# Patient Record
Sex: MALE | Race: WHITE | NOT HISPANIC OR LATINO | Employment: OTHER | URBAN - METROPOLITAN AREA
[De-identification: names, ages, dates, MRNs, and addresses within clinical notes are randomized per-mention and may not be internally consistent; named-entity substitution may affect disease eponyms.]

---

## 2023-03-09 ENCOUNTER — OFFICE VISIT (OUTPATIENT)
Dept: ORTHOPEDICS | Facility: CLINIC | Age: 63
End: 2023-03-09
Payer: COMMERCIAL

## 2023-03-09 ENCOUNTER — ANCILLARY PROCEDURE (OUTPATIENT)
Dept: GENERAL RADIOLOGY | Facility: CLINIC | Age: 63
End: 2023-03-09
Attending: FAMILY MEDICINE
Payer: COMMERCIAL

## 2023-03-09 VITALS — SYSTOLIC BLOOD PRESSURE: 122 MMHG | DIASTOLIC BLOOD PRESSURE: 82 MMHG

## 2023-03-09 DIAGNOSIS — M25.511 RIGHT SHOULDER PAIN: ICD-10-CM

## 2023-03-09 DIAGNOSIS — M75.31 CALCIFIC TENDINITIS OF RIGHT SHOULDER: Primary | ICD-10-CM

## 2023-03-09 DIAGNOSIS — M25.511 ACUTE PAIN OF RIGHT SHOULDER: ICD-10-CM

## 2023-03-09 PROCEDURE — 20611 DRAIN/INJ JOINT/BURSA W/US: CPT | Mod: LT | Performed by: FAMILY MEDICINE

## 2023-03-09 PROCEDURE — 99203 OFFICE O/P NEW LOW 30 MIN: CPT | Mod: 25 | Performed by: FAMILY MEDICINE

## 2023-03-09 PROCEDURE — 73030 X-RAY EXAM OF SHOULDER: CPT | Mod: TC | Performed by: RADIOLOGY

## 2023-03-09 RX ADMIN — BETAMETHASONE SODIUM PHOSPHATE AND BETAMETHASONE ACETATE 6 MG: 3; 3 INJECTION, SUSPENSION INTRA-ARTICULAR; INTRALESIONAL; INTRAMUSCULAR; SOFT TISSUE at 17:05

## 2023-03-09 RX ADMIN — ROPIVACAINE HYDROCHLORIDE 4 ML: 5 INJECTION, SOLUTION EPIDURAL; INFILTRATION; PERINEURAL at 17:05

## 2023-03-09 ASSESSMENT — PAIN SCALES - GENERAL: PAINLEVEL: MODERATE PAIN (4)

## 2023-03-09 NOTE — PROGRESS NOTES
ASSESSMENT & PLAN    Dat was seen today for pain.    Diagnoses and all orders for this visit:    Calcific tendinitis of right shoulder  -     Large Joint Injection/Arthocentesis: L subacromial bursa    Acute pain of right shoulder  -     XR Shoulder Right G/E 3 Views; Future      This issue is acute and Worsening.    # Acute Right Shoulder Pain: Dat Arango  was seen today for acute worsening left shoulder pain. Symptoms had been going on for 2-3 weeks. On examination there are positive findings of tenderness to palpation over the right shoulder pain with range of motion and rotator cuff testing. Pain affecting sleep. Imaging findings showed right shoulder calcific tendinitis. Likely cause of patient's condition due to flare of calcific tendinitis. Counseled patient on nature of condition and treatment options.  Given this plan as below, follow-up 2 weeks     Image Findings: calcific tendinitis in right shoulder  Treatment: Activities as tolerated, home exercises given today  Job: As tolerated  Medications/Injections: Limited tylenol/ibuprofen for pain for 1-2 weeks, Topical Voltaren gel, right subacromial bursa steroid injection  Follow-up: In 2 weeks if symptoms do not improve, sooner if worsening  Can consider repeat evaluation       Khoa Guzman MD  [facility]    -----  Chief Complaint   Patient presents with     Left Shoulder - Pain       SUBJECTIVE  Dat Arango is a/an 62 year old male who is seen as a self referral, WALK IN for evaluation of left shoulder pain.     The patient is seen by themselves.  The patient is Left handed    Onset: 3/7/23, 2 day(s) ago. Patient describes injury as shoveling snow. Pain after raking roof.  Location of Pain: right lateral shoulder  Worsened by: shoulder abduction, overhead movements   Better with: Tylenol  Treatments tried: rest/activity avoidance and Tylenol  Associated symptoms: no distal numbness or tingling; denies  swelling or warmth    Orthopedic/Surgical history: YES - Similar pain about 2 weeks ago shoveling snow off roof   Social History/Occupation:      No family history pertinent to patient's problem today.      REVIEW OF SYSTEMS:  Review of Systems  Constitutional, HEENT, cardiovascular, pulmonary, gi and gu systems are negative, except as otherwise noted.    OBJECTIVE:  /82    General: healthy, alert and in no distress  HEENT: no scleral icterus or conjunctival erythema  Skin: no suspicious lesions or rash. No jaundice.  CV: distal perfusion intact    Resp: normal respiratory effort without conversational dyspnea   Psych: normal mood and affect  Gait: normal steady gait with appropriate coordination and balance   Neuro: Normal light sensory exam of right upper extremity      Ortho Exam   RIGHT SHOULDER  Inspection:    no swelling, bruising, discoloration, or obvious deformity or asymmetry  Palpation:  Significant tenderness to palpation over the supraspinatus    Active Range of Motion:   ROM limited 2/2 severe pain      Strength:  Strength testing limited 2/2 severe pain    CERVICAL SPINE  Inspection:    normal cervical lordosis present, rounded shoulders, forward head posture  Palpation:    Nontender.  Range of Motion:     Flexion full    Extension full    Right side bend full    Left side bend full    Right rotation full    Left rotation full  Strength:    Full strength throughout all neck muscles  Special Tests:    Positive: None    Negative: Spurling's (bilateral)        RADIOLOGY:  I independently  ordered, visualized and reviewed these images with the patient  Right shoulder calcific tendinitis      Review of external notes as documented elsewhere in note  Review of the result(s) of each unique test - right shoulder x-rays       Disclaimer: This note consists of symbols derived from keyboarding, dictation and/or voice recognition software. As a result, there may be errors in the script  that have gone undetected. Please consider this when interpreting information found in this chart.    Large Joint Injection/Arthocentesis: L subacromial bursa    Date/Time: 3/9/2023 5:05 PM  Performed by: Khoa Guzman MD  Authorized by: Khoa Guzman MD     Indications:  Pain  Needle Size:  25 G  Guidance: ultrasound    Approach:  Lateral  Location:  Shoulder      Site:  L subacromial bursa  Medications:  6 mg betamethasone acet & sod phos 6 (3-3) MG/ML; 4 mL ropivacaine 5 MG/ML  Outcome:  Tolerated well, no immediate complications  Procedure discussed: discussed risks, benefits, and alternatives    Consent Given by:  Patient  Timeout: timeout called immediately prior to procedure    Prep: patient was prepped and draped in usual sterile fashion     Ultrasound images of procedure were permanently stored.     Patient reported significant improvement of pain after the numbing portion left subacromial bursa steroid injection.  Ultrasound guided images were permanently stored.   Aftercare instructions given to patient.  Plan to follow-up as discussed above.     Khoa Guzman MD [facility]

## 2023-03-09 NOTE — PATIENT INSTRUCTIONS
# Acute Right Shoulder Pain: Dat Arango  was seen today for acute worsening left shoulder pain. Symptoms had been going on for 2-3 weeks. On examination there are positive findings of tenderness to palpation over the right shoulder pain with range of motion and rotator cuff testing. Pain affecting sleep. Imaging findings showed right shoulder calcific tendinitis. Likely cause of patient's condition due to flare of calcific tendinitis. Counseled patient on nature of condition and treatment options.  Given this plan as below, follow-up 2 weeks     Image Findings: calcific tendinitis in right shoulder  Treatment: Activities as tolerated, home exercises given today  Job: As tolerated  Medications/Injections: Limited tylenol/ibuprofen for pain for 1-2 weeks, Topical Voltaren gel, right subacromial bursa steroid injection  Follow-up: In 2 weeks if symptoms do not improve, sooner if worsening  Can consider repeat evaluation    Please call [phone]   Ask for my team if you have any questions or concerns    If you have not yet received the influenza vaccine but would like to get one, please call  [phone] or you can schedule via [employer]    It was great seeing you today!    Khoa Guzman MD, CAQSM     FS Injection Discharge Instructions    Procedure: right subacromial bursa steroid injection     You may shower, however avoid swimming, tub baths or hot tubs for 24 hours following your procedure  You may have a mild to moderate increase in pain for several days following the injection.  It may take up to 14 days for the steroid medication to start working although you may feel the effect as early as a few days after the procedure.  You may use ice packs for 10-15 minutes, 3 to 4 times a day at the injection site for comfort  You may use anti-inflammatory medications (such as Ibuprofen or Aleve or Advil) or Tylenol for pain control if necessary  If you were fasting, you may resume your normal diet and  medications after the procedure  If you have diabetes, check your blood sugar more frequently than usual as your blood sugar may be higher than normal for 10-14 days following a steroid injection. Contact your doctor who manages your diabetes if your blood sugar is higher than usual    If you experience any of the following, call [facility] @ [phone] or [phone]  -Fever over 100 degree F  -Swelling, bleeding, redness, drainage, warmth at the injection site  - New or worsening pain

## 2023-03-09 NOTE — LETTER
3/9/2023         RE: Dat Arango  [address] [address]        Dear Colleague,    Thank you for referring your patient, Dat Arango, to the [facility]. Please see a copy of my visit note below.    ASSESSMENT & PLAN    Dat was seen today for pain.    Diagnoses and all orders for this visit:    Calcific tendinitis of right shoulder  -     Large Joint Injection/Arthocentesis: L subacromial bursa    Acute pain of right shoulder  -     XR Shoulder Right G/E 3 Views; Future      This issue is acute and Worsening.    # Acute Right Shoulder Pain: Dat Arango  was seen today for acute worsening left shoulder pain. Symptoms had been going on for 2-3 weeks. On examination there are positive findings of tenderness to palpation over the right shoulder pain with range of motion and rotator cuff testing. Pain affecting sleep. Imaging findings showed right shoulder calcific tendinitis. Likely cause of patient's condition due to flare of calcific tendinitis. Counseled patient on nature of condition and treatment options.  Given this plan as below, follow-up 2 weeks     Image Findings: calcific tendinitis in right shoulder  Treatment: Activities as tolerated, home exercises given today  Job: As tolerated  Medications/Injections: Limited tylenol/ibuprofen for pain for 1-2 weeks, Topical Voltaren gel, right subacromial bursa steroid injection  Follow-up: In 2 weeks if symptoms do not improve, sooner if worsening  Can consider repeat evaluation       Khoa Guzman MD  [facility]    -----  Chief Complaint   Patient presents with     Left Shoulder - Pain       SUBJECTIVE  Dat Arango is a/an 62 year old male who is seen as a self referral, WALK IN for evaluation of left shoulder pain.     The patient is seen by themselves.  The patient is Left handed    Onset: 3/7/23, 2 day(s) ago. Patient describes injury as shoveling  snow. Pain after raking roof.  Location of Pain: right lateral shoulder  Worsened by: shoulder abduction, overhead movements   Better with: Tylenol  Treatments tried: rest/activity avoidance and Tylenol  Associated symptoms: no distal numbness or tingling; denies swelling or warmth    Orthopedic/Surgical history: YES - Similar pain about 2 weeks ago shoveling snow off roof   Social History/Occupation:      No family history pertinent to patient's problem today.      REVIEW OF SYSTEMS:  Review of Systems  Constitutional, HEENT, cardiovascular, pulmonary, gi and gu systems are negative, except as otherwise noted.    OBJECTIVE:  /82    General: healthy, alert and in no distress  HEENT: no scleral icterus or conjunctival erythema  Skin: no suspicious lesions or rash. No jaundice.  CV: distal perfusion intact    Resp: normal respiratory effort without conversational dyspnea   Psych: normal mood and affect  Gait: normal steady gait with appropriate coordination and balance   Neuro: Normal light sensory exam of right upper extremity      Ortho Exam   RIGHT SHOULDER  Inspection:    no swelling, bruising, discoloration, or obvious deformity or asymmetry  Palpation:  Significant tenderness to palpation over the supraspinatus    Active Range of Motion:   ROM limited 2/2 severe pain      Strength:  Strength testing limited 2/2 severe pain    CERVICAL SPINE  Inspection:    normal cervical lordosis present, rounded shoulders, forward head posture  Palpation:    Nontender.  Range of Motion:     Flexion full    Extension full    Right side bend full    Left side bend full    Right rotation full    Left rotation full  Strength:    Full strength throughout all neck muscles  Special Tests:    Positive: None    Negative: Spurling's (bilateral)        RADIOLOGY:  I independently  ordered, visualized and reviewed these images with the patient  Right shoulder calcific tendinitis      Review of external notes as  documented elsewhere in note  Review of the result(s) of each unique test - right shoulder x-rays       Disclaimer: This note consists of symbols derived from keyboarding, dictation and/or voice recognition software. As a result, there may be errors in the script that have gone undetected. Please consider this when interpreting information found in this chart.    Large Joint Injection/Arthocentesis: L subacromial bursa    Date/Time: 3/9/2023 5:05 PM  Performed by: Khoa Guzman MD  Authorized by: Khoa Guzman MD     Indications:  Pain  Needle Size:  25 G  Guidance: ultrasound    Approach:  Lateral  Location:  Shoulder      Site:  L subacromial bursa  Medications:  6 mg betamethasone acet & sod phos 6 (3-3) MG/ML; 4 mL ropivacaine 5 MG/ML  Outcome:  Tolerated well, no immediate complications  Procedure discussed: discussed risks, benefits, and alternatives    Consent Given by:  Patient  Timeout: timeout called immediately prior to procedure    Prep: patient was prepped and draped in usual sterile fashion     Ultrasound images of procedure were permanently stored.     Patient reported significant improvement of pain after the numbing portion left subacromial bursa steroid injection.  Ultrasound guided images were permanently stored.   Aftercare instructions given to patient.  Plan to follow-up as discussed above.     Khoa Guzman MD [facility]                Again, thank you for allowing me to participate in the care of your patient.        Sincerely,        Khoa Guzman MD

## 2023-03-13 RX ORDER — ROPIVACAINE HYDROCHLORIDE 5 MG/ML
4 INJECTION, SOLUTION EPIDURAL; INFILTRATION; PERINEURAL
Status: SHIPPED | OUTPATIENT
Start: 2023-03-09

## 2023-03-13 RX ORDER — BETAMETHASONE SODIUM PHOSPHATE AND BETAMETHASONE ACETATE 3; 3 MG/ML; MG/ML
6 INJECTION, SUSPENSION INTRA-ARTICULAR; INTRALESIONAL; INTRAMUSCULAR; SOFT TISSUE
Status: SHIPPED | OUTPATIENT
Start: 2023-03-09

## 2023-03-23 ENCOUNTER — ANCILLARY PROCEDURE (OUTPATIENT)
Dept: GENERAL RADIOLOGY | Facility: CLINIC | Age: 63
End: 2023-03-23
Attending: FAMILY MEDICINE
Payer: COMMERCIAL

## 2023-03-23 ENCOUNTER — OFFICE VISIT (OUTPATIENT)
Dept: ORTHOPEDICS | Facility: CLINIC | Age: 63
End: 2023-03-23
Payer: COMMERCIAL

## 2023-03-23 DIAGNOSIS — M70.22 OLECRANON BURSITIS OF LEFT ELBOW: ICD-10-CM

## 2023-03-23 DIAGNOSIS — M75.31 CALCIFIC TENDINITIS OF RIGHT SHOULDER: ICD-10-CM

## 2023-03-23 DIAGNOSIS — M25.552 CHRONIC LEFT HIP PAIN: Primary | ICD-10-CM

## 2023-03-23 DIAGNOSIS — M25.552 CHRONIC LEFT HIP PAIN: ICD-10-CM

## 2023-03-23 DIAGNOSIS — G89.29 CHRONIC LEFT HIP PAIN: ICD-10-CM

## 2023-03-23 DIAGNOSIS — G89.29 CHRONIC LEFT HIP PAIN: Primary | ICD-10-CM

## 2023-03-23 PROCEDURE — 99214 OFFICE O/P EST MOD 30 MIN: CPT | Performed by: FAMILY MEDICINE

## 2023-03-23 PROCEDURE — 73502 X-RAY EXAM HIP UNI 2-3 VIEWS: CPT | Mod: TC | Performed by: RADIOLOGY

## 2023-03-23 ASSESSMENT — PAIN SCALES - GENERAL: PAINLEVEL: NO PAIN (1)

## 2023-03-23 NOTE — PROGRESS NOTES
ASSESSMENT & PLAN    Dat was seen today for pain and hip left.    Diagnoses and all orders for this visit:    Chronic left hip pain  -     XR Pelvis w Hip LT 1 View; Future    Calcific tendinitis of right shoulder    Olecranon bursitis of left elbow        # Right Shoulder, Left Hip Pain, Left Olecranon Bursitis: Dat Arango was seen today for follow-up on right shoulder and new left hip pain. Symptoms had been going on for 3-4 mon. On examination there are positive findings of improved pain over the right shoulder, tenderness palpation over the left greater trochanter, mild swelling over the left olecranon bursa with mild tenderness to palpation. Imaging findings showed no hip arthritis. Likely cause of patient's condition due to calcific tendinitis now improving on the right side, gluteal tendinopathy on the left, left olecranon bursitis. Other possible conditions contributing to symptoms include hip arthritis but lower concern given no pain in the groin.  Counseled patient on nature of condition and treatment options.  Given this plan as below, follow-up 3 to 4 weeks if not improving, sooner if worsening     Image Findings: no left hip arthritis  Treatment: Activities as tolerated, home exercises given today for left hip, sleeve for elbow, pillow/pad for car  Job as tolerated  Medications/Injections: Limited tylenol/ibuprofen for pain for 1-2 weeks, Topical Voltaren gel, defer for now  Follow-up: In one month if symptoms do not improve, sooner if worsening  Can consider left greater trochanter steroid injection    -----    SUBJECTIVE:  Dat Arango is a 62 year old male who is seen in follow-up for right shoulder pain. They were last seen 3/9/2023 and subacromial steroid injection was performed.  The patient is seen by themselves.    Since their last visit reports significantly improved pain.  They indicate that their current pain level is 1/10. They have tried Tylenol.      # Left Hip Pain: 3-4 months worse  getting out of a car, walking. Pain improving but can wax/wane.     Patient's past medical, surgical, social, and family histories were reviewed today and no changes are noted.    REVIEW OF SYSTEMS:  Constitutional: NEGATIVE for fever, chills, change in weight  Skin: NEGATIVE for worrisome rashes, moles or lesions  GI/: NEGATIVE for bowel or bladder changes  Neuro: NEGATIVE for weakness, dizziness or paresthesias    OBJECTIVE:  There were no vitals taken for this visit.   General: healthy, alert and in no distress  HEENT: no scleral icterus or conjunctival erythema  Skin: no suspicious lesions or rash. No jaundice.  CV: regular rhythm by palpation, no pedal edema  Resp: normal respiratory effort without conversational dyspnea   Psych: normal mood and affect  Gait: normal steady gait with appropriate coordination and balance  Neuro: normal light touch sensory exam of the extremities.    MSK:    RIGHT SHOULDER  Inspection:    no swelling, bruising, discoloration, or obvious deformity or asymmetry  Palpation:    Tender about the supraspinatus insertion. Remainder of bony and tendinous landmarks are nontender.  Active Range of Motion:   intact range of motion  Strength:  rotator cuff strength intact    CERVICAL SPINE  Inspection:    normal cervical lordosis present, rounded shoulders, forward head posture  Palpation:    Nontender.  Range of Motion:     Flexion full    Extension full    Right side bend full    Left side bend full    Right rotation full    Left rotation full  Strength:    Full strength throughout all neck muscles  Special Tests:    Positive: None    Negative: Spurling's (bilateral)    LEFT HIP  Inspection:    No swelling, bruising, discoloration, or obvious deformity or asymmetry  Palpation:    Tender about the greater trochanteric region. Otherwise all other landmarks are nontender.    Crepitus is Absent  Active Range of Motion:     Flexion full, extension full / IR full / ER  full  Strength:     Flexion 5/5 / extension 5/5 / adduction 5/5 / abduction 5/5  Special Tests:    Positive: PEDRO pain over greater trochanter    Negative: Logroll, anterior impingement (FADIR)    LEFT ELBOW  Inspection:  Mild olecranon bursitis, mild tender to palpation        Independent visualization of the below image:  Results for orders placed or performed in visit on 03/09/23   XR Shoulder Right G/E 3 Views    Narrative    EXAM: XR SHOULDER RIGHT G/E 3 VIEWS  LOCATION: [facility] [place]  DATE/TIME: 3/9/2023 4:39 PM    INDICATION: Right shoulder pain.  COMPARISON: None.      Impression    IMPRESSION:   1.  Normal right glenohumeral joint spacing and alignment.  2.  Moderate acromioclavicular arthrosis.  3.  No fracture.  4.  Tiny foci of tendinous calcification at the superior margin of the humeral greater tuberosity. Although often asymptomatic and incidental, this could be evidence of calcific tendinitis in the correct clinical setting.           Khoa Guzman MD, [facility]    Disclaimer: This note consists of symbols derived from keyboarding, dictation and/or voice recognition software. As a result, there may be errors in the script that have gone undetected. Please consider this when interpreting information found in this chart.

## 2023-03-23 NOTE — PATIENT INSTRUCTIONS
# Right Shoulder, Left Hip Pain, Left Olecranon Bursitis: Dat Arango was seen today for follow-up on right shoulder and new left hip pain. Symptoms had been going on for 3-4 mon. On examination there are positive findings of improved pain over the right shoulder, tenderness palpation over the left greater trochanter, mild swelling over the left olecranon bursa with mild tenderness to palpation. Imaging findings showed no hip arthritis. Likely cause of patient's condition due to calcific tendinitis not improving on the right side, gluteal tendinopathy on the left, left olecranon bursitis. Other possible conditions contributing to symptoms include hip arthritis but lower concern given no pain in the groin.  Counseled patient on nature of condition and treatment options.  Given this plan as below, follow-up 3 to 4 weeks if not improving, sooner if worsening     Image Findings: no left hip arthritis  Treatment: Activities as tolerated, home exercises given today for left hip, sleeve for elbow, pillow/pad for car  Job as tolerated  Medications/Injections: Limited tylenol/ibuprofen for pain for 1-2 weeks, Topical Voltaren gel, defer for now  Follow-up: In one month if symptoms do not improve, sooner if worsening  Can consider left greater trochanter steroid injection    Please call [phone]   Ask for my team if you have any questions or concerns    If you have not yet received the influenza vaccine but would like to get one, please call  [phone] or you can schedule via [employer]    It was great seeing you again today!    Khoa Guzman MD, [facility]

## 2023-03-23 NOTE — LETTER
3/23/2023         RE: Dat Arango  [address] [facility] [address] [facility] [address]  [place] [id number]        Dear Colleague,    Thank you for referring your patient, Dat Arango, to the [facility]. Please see a copy of my visit note below.    ASSESSMENT & PLAN    Dat was seen today for pain and hip left.    Diagnoses and all orders for this visit:    Chronic left hip pain  -     XR Pelvis w Hip LT 1 View; Future    Calcific tendinitis of right shoulder    Olecranon bursitis of left elbow        # Right Shoulder, Left Hip Pain, Left Olecranon Bursitis: Dat Arango was seen today for follow-up on right shoulder and new left hip pain. Symptoms had been going on for 3-4 mon. On examination there are positive findings of improved pain over the right shoulder, tenderness palpation over the left greater trochanter, mild swelling over the left olecranon bursa with mild tenderness to palpation. Imaging findings showed no hip arthritis. Likely cause of patient's condition due to calcific tendinitis now improving on the right side, gluteal tendinopathy on the left, left olecranon bursitis. Other possible conditions contributing to symptoms include hip arthritis but lower concern given no pain in the groin.  Counseled patient on nature of condition and treatment options.  Given this plan as below, follow-up 3 to 4 weeks if not improving, sooner if worsening     Image Findings: no left hip arthritis  Treatment: Activities as tolerated, home exercises given today for left hip, sleeve for elbow, pillow/pad for car  Job as tolerated  Medications/Injections: Limited tylenol/ibuprofen for pain for 1-2 weeks, Topical Voltaren gel, defer for now  Follow-up: In one month if symptoms do not improve, sooner if worsening  Can consider left greater trochanter steroid injection    -----    SUBJECTIVE:  Dat Arango is a 62 year old male who is seen in follow-up for right shoulder pain. They were last seen 3/9/2023  and subacromial steroid injection was performed.  The patient is seen by themselves.    Since their last visit reports significantly improved pain.  They indicate that their current pain level is 1/10. They have tried Tylenol.      # Left Hip Pain: 3-4 months worse getting out of a car, walking. Pain improving but can wax/wane.     Patient's past medical, surgical, social, and family histories were reviewed today and no changes are noted.    REVIEW OF SYSTEMS:  Constitutional: NEGATIVE for fever, chills, change in weight  Skin: NEGATIVE for worrisome rashes, moles or lesions  GI/: NEGATIVE for bowel or bladder changes  Neuro: NEGATIVE for weakness, dizziness or paresthesias    OBJECTIVE:  There were no vitals taken for this visit.   General: healthy, alert and in no distress  HEENT: no scleral icterus or conjunctival erythema  Skin: no suspicious lesions or rash. No jaundice.  CV: regular rhythm by palpation, no pedal edema  Resp: normal respiratory effort without conversational dyspnea   Psych: normal mood and affect  Gait: normal steady gait with appropriate coordination and balance  Neuro: normal light touch sensory exam of the extremities.    MSK:    RIGHT SHOULDER  Inspection:    no swelling, bruising, discoloration, or obvious deformity or asymmetry  Palpation:    Tender about the supraspinatus insertion. Remainder of bony and tendinous landmarks are nontender.  Active Range of Motion:   intact range of motion  Strength:  rotator cuff strength intact    CERVICAL SPINE  Inspection:    normal cervical lordosis present, rounded shoulders, forward head posture  Palpation:    Nontender.  Range of Motion:     Flexion full    Extension full    Right side bend full    Left side bend full    Right rotation full    Left rotation full  Strength:    Full strength throughout all neck muscles  Special Tests:    Positive: None    Negative: Spurling's (bilateral)    LEFT HIP  Inspection:    No swelling, bruising,  discoloration, or obvious deformity or asymmetry  Palpation:    Tender about the greater trochanteric region. Otherwise all other landmarks are nontender.    Crepitus is Absent  Active Range of Motion:     Flexion full, extension full / IR full / ER  full  Strength:    Flexion 5/5 / extension 5/5 / adduction 5/5 / abduction 5/5  Special Tests:    Positive: PEDRO pain over greater trochanter    Negative: Logroll, anterior impingement (FADIR)    LEFT ELBOW  Inspection:  Mild olecranon bursitis, mild tender to palpation        Independent visualization of the below image:  Results for orders placed or performed in visit on 03/09/23   XR Shoulder Right G/E 3 Views    Narrative    EXAM: XR SHOULDER RIGHT G/E 3 VIEWS  LOCATION: [facility]  DATE/TIME: 3/9/2023 4:39 PM    INDICATION: Right shoulder pain.  COMPARISON: None.      Impression    IMPRESSION:   1.  Normal right glenohumeral joint spacing and alignment.  2.  Moderate acromioclavicular arthrosis.  3.  No fracture.  4.  Tiny foci of tendinous calcification at the superior margin of the humeral greater tuberosity. Although often asymptomatic and incidental, this could be evidence of calcific tendinitis in the correct clinical setting.           Khoa Guzman MD, [facility]    Disclaimer: This note consists of symbols derived from keyboarding, dictation and/or voice recognition software. As a result, there may be errors in the script that have gone undetected. Please consider this when interpreting information found in this chart.          Again, thank you for allowing me to participate in the care of your patient.        Sincerely,        Khoa Guzman MD

## 2024-07-16 ENCOUNTER — TRANSCRIBE ORDERS (OUTPATIENT)
Dept: OTHER | Age: 64
End: 2024-07-16

## 2024-07-16 DIAGNOSIS — Z12.11 SCREENING FOR COLON CANCER: Primary | ICD-10-CM

## 2024-09-19 ENCOUNTER — TELEPHONE (OUTPATIENT)
Dept: GASTROENTEROLOGY | Facility: CLINIC | Age: 64
End: 2024-09-19
Payer: COMMERCIAL

## 2024-09-19 NOTE — TELEPHONE ENCOUNTER
"Endoscopy Scheduling Screen      What insurance is in the chart?  Other:  [employer]     Ordering/Referring Provider: Lizzy   (If ordering provider performs procedure, schedule with ordering provider unless otherwise instructed. )    BMI: There is no height or weight on file to calculate BMI.     Sedation Ordered  general anesthesia.   BMI<= 45 45 < BMI <= 48 48 < BMI < = 50  BMI > 50   No Restrictions No [facility]  No [facility]  [place] [facility] with exceptions Hospital Only OR Only       Do you have a history of malignant hyperthermia?  No    (Females) Are you currently pregnant?   No     Have you been diagnosed or told you have pulmonary hypertension?   No    Do you have any heart conditions?  No     Do you have an LVAD?  No    Have you been told you have moderate to severe sleep apnea?  Yes. (RN Review required for scheduling unless scheduling in Hospital.)  Does use CPAP  Have you been told you have COPD, asthma, or any other lung disease?  No    Have you ever had or are you waiting for an organ transplant?  No. Continue scheduling, no site restrictions.    Have you had a stroke or transient ischemic attack (TIA aka \"mini stroke\" in the last 6 months?   No    Have you been diagnosed with or been told you have cirrhosis of the liver?   No.    Are you currently on dialysis?   No    Do you need assistance transferring?   No    BMI: There is no height or weight on file to calculate BMI.     Is patients BMI > 50?  No    BMI > 40?  No    Do you have a diagnosis of diabetes?  Yes (Golytely Prep)    Do you take an injectable medication for weight loss or diabetes (excluding insulin)?  No    Do you take the medication Naltrexone?  No    Do you take blood thinners?  No     Prep   Are you currently on dialysis or do you have chronic kidney disease?  No    Do you have a diagnosis of cystic fibrosis (CF)?  No    On a regular basis do you go 3 -5 days between bowel movements?  No    Preferred Pharmacy:  [facility]    Final " Scheduling Details     Procedure scheduled  Colonoscopy    Surgeon:  N/A     Date of procedure:  N/A     Location  [place] - Per order.    What is your communication preference for Instructions and/or Bowel Prep?   Mail/USPS    Patient Reminders:    You will receive a call from a Nurse to review instructions and health history.  This assessment must be completed prior to your procedure.  Failure to complete the Nurse assessment may result in the procedure being cancelled.       On the day of your procedure, please designate an adult(s) who can drive you home stay with you for the next 24 hours. The medicines used in the exam will make you sleepy. You will not be able to drive.       You cannot take public transportation, ride share services, or non-medical taxi service without a responsible caregiver.  Medical transport services are allowed with the requirement that a responsible caregiver will receive you at your destination.  We require that drivers and caregivers are confirmed prior to your procedure.

## 2025-02-24 RX ORDER — BISACODYL 5 MG/1
TABLET, DELAYED RELEASE ORAL
Qty: 4 TABLET | Refills: 0 | Status: SHIPPED | OUTPATIENT
Start: 2025-02-24

## 2025-02-28 ENCOUNTER — HOSPITAL ENCOUNTER (OUTPATIENT)
Facility: CLINIC | Age: 65
Discharge: HOME | End: 2025-02-28
Attending: SURGERY | Admitting: SURGERY
Payer: COMMERCIAL

## 2025-02-28 VITALS
WEIGHT: 195 LBS | DIASTOLIC BLOOD PRESSURE: 99 MMHG | RESPIRATION RATE: 18 BRPM | HEIGHT: 67 IN | TEMPERATURE: 98.4 F | OXYGEN SATURATION: 97 % | SYSTOLIC BLOOD PRESSURE: 143 MMHG | HEART RATE: 67 BPM | BODY MASS INDEX: 30.61 KG/M2

## 2025-02-28 DIAGNOSIS — Z12.11 SPECIAL SCREENING FOR MALIGNANT NEOPLASMS, COLON: Primary | ICD-10-CM

## 2025-02-28 DIAGNOSIS — Z86.0100 HISTORY OF COLON POLYPS: ICD-10-CM

## 2025-02-28 LAB
COLONOSCOPY: NORMAL
GLUCOSE BLDC GLUCOMTR-MCNC: 147 MG/DL (ref 70–99)

## 2025-02-28 PROCEDURE — 82962 GLUCOSE BLOOD TEST: CPT

## 2025-02-28 PROCEDURE — 45380 COLONOSCOPY AND BIOPSY: CPT | Performed by: SURGERY

## 2025-02-28 PROCEDURE — 258N000003 HC RX IP 258 OP 636: Performed by: NURSE ANESTHETIST, CERTIFIED REGISTERED

## 2025-02-28 PROCEDURE — 88305 TISSUE EXAM BY PATHOLOGIST: CPT | Mod: TC | Performed by: SURGERY

## 2025-02-28 PROCEDURE — 370N000017 HC ANESTHESIA TECHNICAL FEE, PER MIN: Performed by: SURGERY

## 2025-02-28 PROCEDURE — 45385 COLONOSCOPY W/LESION REMOVAL: CPT | Performed by: SURGERY

## 2025-02-28 RX ORDER — ROSUVASTATIN CALCIUM 5 MG/1
5 TABLET, COATED ORAL AT BEDTIME
COMMUNITY

## 2025-02-28 RX ORDER — SODIUM CHLORIDE, SODIUM LACTATE, POTASSIUM CHLORIDE, CALCIUM CHLORIDE 600; 310; 30; 20 MG/100ML; MG/100ML; MG/100ML; MG/100ML
INJECTION, SOLUTION INTRAVENOUS CONTINUOUS
Status: DISCONTINUED | OUTPATIENT
Start: 2025-02-28 | End: 2025-02-28 | Stop reason: HOSPADM

## 2025-02-28 RX ORDER — TAMSULOSIN HYDROCHLORIDE 0.4 MG/1
CAPSULE ORAL
COMMUNITY

## 2025-02-28 RX ORDER — AMLODIPINE BESYLATE 10 MG/1
10 TABLET ORAL DAILY
COMMUNITY

## 2025-02-28 RX ORDER — EMPAGLIFLOZIN 10 MG/1
10 TABLET, FILM COATED ORAL DAILY
COMMUNITY

## 2025-02-28 RX ORDER — LIDOCAINE 40 MG/G
CREAM TOPICAL
Status: DISCONTINUED | OUTPATIENT
Start: 2025-02-28 | End: 2025-02-28 | Stop reason: HOSPADM

## 2025-02-28 RX ADMIN — SODIUM CHLORIDE, POTASSIUM CHLORIDE, SODIUM LACTATE AND CALCIUM CHLORIDE: 600; 310; 30; 20 INJECTION, SOLUTION INTRAVENOUS at 09:32

## 2025-02-28 ASSESSMENT — ACTIVITIES OF DAILY LIVING (ADL)
ADLS_ACUITY_SCORE: 41

## 2025-02-28 NOTE — LETTER
Dat Arango  [address] [id number]    March 3, 2025    Dear Dat,  This letter is written to inform you of the results of your recent colonoscopy.  Your examination showed polyp(s) in your transverse colon and sigmoid colon. All polyps were removed in their entirety and sent for review by a pathologist. As you will see on the pathology report below, the tissue(s) were tubular adenomatous polyps and your colonoscopy showed inadequate prep. Your examination was limited due to inadequate prep..    Final Diagnosis   A(1).  Colon, Transverse, polyp, polypectomy:  -Tubular adenoma  -Negative for high-grade dysplasia and malignancy.        B(2).  Colon, Sigmoid, polyp, polypectomy:  -Hyperplastic polyp  -Negative for dysplasia or malignancy.     Adenomatous polyps are entirely benign (non-cancerous); however, patients who have developed these polyps are at an increased risk for developing additional polyps in the future. If these are not eventually removed, there is a risk of developing colon cancer. We will advise more frequent examinations with you because of the risk associated with this type of polyp.  Hyperplastic polyps are entirely benign (non-cancerous) and rarely associated with the development of additional polyps or colorectal cancer.    Given these findings, I recommend that you undergo a repeat colonoscopy in 1 year(s) for surveillance. We will enter you into a recall system so you receive a reminder closer to the time that you are due for repeat examination.       Please remember that this recommendation is made with the understanding that you are not experiencing persistent changes in bowel function, bleeding per rectum, and/or significant abdominal pain. If you experience these symptoms, please contact your primary care provider for a further evaluation.     If you have any questions or concerns about the results of your colonoscopy or the appropriate follow-up, please contact  the general surgery [facility] center at [phone].    Sincerely,          Bessie Velasco MD    Electronically signed  [facility]

## 2025-02-28 NOTE — H&P
[facility]    Pre-Endoscopy History and Physical     Dat Arango MRN# [id number]   YOB: 1960 Age: 64 year old     Date of Procedure: 2/28/2025  Primary care provider: Semmler, Steven Duane  Type of Endoscopy: Colonoscopy with possible biopsy, possible polypectomy  Reason for Procedure: screening  Type of Anesthesia Anticipated: MAC    HPI:    Dat is a 64 year old male who will be undergoing the above procedure.  Colon 2014 (HP); no famhx of colon ca; no blood thinner     A history and physical has been performed. The patient's medications and allergies have been reviewed. The risks and benefits of the procedure and the sedation options and risks were discussed with the patient.  All questions were answered and informed consent was obtained.      He denies a personal or family history of anesthesia complications or bleeding disorders.     There is no problem list on file for this patient.       History reviewed. No pertinent past medical history.     Past Surgical History:   Procedure Laterality Date    COLONOSCOPY  5/1/2014    Procedure: COLONOSCOPY;  Surgeon: Aurelio Carlin MD;  Location: [facility]    COLONOSCOPY  5/1/2014    Procedure: COMBINED COLONOSCOPY, SINGLE BIOPSY/POLYPECTOMY BY BIOPSY;  Surgeon: Aurelio Carlin MD;  Location: [facility]       Social History     Tobacco Use    Smoking status: Former    Smokeless tobacco: Never   Substance Use Topics    Alcohol use: No       History reviewed. No pertinent family history.    Prior to Admission medications    Medication Sig Start Date End Date Taking? Authorizing Provider   aspirin 81 MG tablet Take 81 mg by mouth daily   Yes Reported, Patient   bisacodyl (DULCOLAX) 5 MG EC tablet Take 2 tablets at 3 pm the day before your procedure. If your procedure is before 11 am, take 2 additional tablets at 11 pm. If your procedure is after 11 am, take 2 additional tablets at 6 am. For additional instructions refer to your  "colonoscopy prep instructions. 2/24/25  Yes Bessie Velasco MD   LOSARTAN POTASSIUM PO Take 50 mg by mouth daily   Yes Reported, Patient   polyethylene glycol (GOLYTELY) 236 g suspension The night before the exam at 6 pm drink an 8-ounce glass every 15 minutes until the jug is half empty. If you arrive before 11 AM: Drink the other half of the Golytely jug at 11 PM night before procedure. If you arrive after 11 AM: Drink the other half of the Golytely jug at 6 AM day of procedure. For additional instructions refer to your colonoscopy prep instructions. 2/24/25  Yes Bessie Velasco MD   Sildenafil Citrate (VIAGRA PO) Take 100mg by mouth once daily if needed for Erectile Dysfunction. Take 30min to 4 hours before sexual activity. Max 100mg/24hr   Yes Reported, Patient   amLODIPine (NORVASC) 10 MG tablet Take 10 mg by mouth daily.   Yes Reported, Patient   JARDIANCE 10 MG TABS tablet Take 10 mg by mouth daily.    Reported, Patient   rosuvastatin (CRESTOR) 5 MG tablet Take 5 mg by mouth at bedtime.   Yes Reported, Patient   sertraline (ZOLOFT) 50 MG tablet Take 50 mg by mouth every morning.   Yes Reported, Patient   tamsulosin (FLOMAX) 0.4 MG capsule TAKE 1 CAPSULE BY MOUTH ONCE DAILY AFTER A MEAL   Yes Reported, Patient       No Known Allergies     REVIEW OF SYSTEMS:   5 point ROS negative except as noted above in HPI, including Gen., Resp., CV, GI &  system review.    PHYSICAL EXAM:   BP (!) 142/99 (BP Location: Left arm)   Pulse 82   Temp 98.7  F (37.1  C) (Oral)   Ht 1.702 m (5' 7.01\")   Wt 88.5 kg (195 lb)   SpO2 96%   BMI 30.53 kg/m   Estimated body mass index is 30.53 kg/m  as calculated from the following:    Height as of this encounter: 1.702 m (5' 7.01\").    Weight as of this encounter: 88.5 kg (195 lb).   Constitutional: Awake, alert, no acute distress.  Eyes: No scleral icterus.  Conjunctiva are without injection.  ENMT: Mucous membranes moist, dentition and gums are intact.   Neck: Soft, supple, " trachea midline.    Endocrine: n/a   Lymphatic: There is no cervical, submandibularadenopathy.  Respiratory: normal effortgs   Cardiovascular: S1, S2  Abdomen: Non-distended, non-tender,  No masses,  Musculoskeletal: No spinal or CVA tenderness. Full range of motion in the upper and lower extremities.    Skin: No skin rashes or lesions to inspection.  No petechia.    Neurologic: alerted and oriented 3x  Psychiatric: The patient's affect is not blunted and mood is appropriate.  DIAGNOSTICS:    Not indicated    IMPRESSION   ASA Class 2 - Mild systemic disease    PLAN:   Plan for Colonoscopy with possible biopsy, possible polypectomy. We discussed the risks, benefits and alternatives and the patient wished to proceed.  Patient is cleared for the above procedure.    The above has been forwarded to the consulting provider.    [facility],   [facility]

## 2025-03-03 LAB
PATH REPORT.COMMENTS IMP SPEC: NORMAL
PATH REPORT.COMMENTS IMP SPEC: NORMAL
PATH REPORT.FINAL DX SPEC: NORMAL
PATH REPORT.GROSS SPEC: NORMAL
PATH REPORT.MICROSCOPIC SPEC OTHER STN: NORMAL
PATH REPORT.RELEVANT HX SPEC: NORMAL
PHOTO IMAGE: NORMAL

## 2025-03-03 PROCEDURE — 88305 TISSUE EXAM BY PATHOLOGIST: CPT | Mod: 26 | Performed by: PATHOLOGY

## (undated) DEVICE — ENDO SNARE EXACTO COLD 9MM LOOP 2.4MMX230CM 00711115